# Patient Record
Sex: MALE | Race: WHITE | NOT HISPANIC OR LATINO | Employment: OTHER | URBAN - METROPOLITAN AREA
[De-identification: names, ages, dates, MRNs, and addresses within clinical notes are randomized per-mention and may not be internally consistent; named-entity substitution may affect disease eponyms.]

---

## 2017-01-18 ENCOUNTER — TELEPHONE (OUTPATIENT)
Dept: PULMONOLOGY | Facility: CLINIC | Age: 67
End: 2017-01-18

## 2017-01-18 NOTE — TELEPHONE ENCOUNTER
----- Message from Ginger Costello sent at 2017  9:48 AM CST -----  Contact: pt  Pt has appts on  and . Pt states that his referral is , and pt needs to have another referral sent to [facility] for approval (pls angella urgent). Pls call pt back at [phone].

## 2017-01-18 NOTE — TELEPHONE ENCOUNTER
----- Message from Kwasi Banerjee sent at 1/17/2017  2:04 PM CST -----  Contact: pt's spouse  She's calling in regards to the pt's scheduled appt's on 2/8/17 & 2/9/17, please advise, [phone] (home)

## 2017-02-03 ENCOUNTER — TELEPHONE (OUTPATIENT)
Dept: PULMONOLOGY | Facility: CLINIC | Age: 67
End: 2017-02-03

## 2017-02-03 NOTE — TELEPHONE ENCOUNTER
----- Message from Ginger Costello sent at 2/3/2017  9:30 AM CST -----  Contact: Pt  Pt is requesting to speak to the nurse regarding his appts on 02/08 and 02/09. [facility] needs to have authorizations for the appts. Pls call Ms. Montilla at [facility] at [phone] ext [phone]. Pt can be reached at [phone].

## 2017-02-03 NOTE — TELEPHONE ENCOUNTER
Mrs. Anant Zaragoza with [facility] referrals spoke with wife and will call her back with the confirmation

## 2017-02-06 ENCOUNTER — TELEPHONE (OUTPATIENT)
Dept: PULMONOLOGY | Facility: CLINIC | Age: 67
End: 2017-02-06

## 2017-02-06 NOTE — TELEPHONE ENCOUNTER
----- Message from Hellen Maza sent at 2/6/2017  9:45 AM CST -----  Pt Wife(Hellen) at [phone]//states pt has a C T Scan on 2-8-17//is calling to make sure your office received the  Authorization//they live a distance and does not want to drive here if you have not gotten it//he also has appts on 2-9-17 to get the results//please call asap//keri/carmina

## 2017-02-16 ENCOUNTER — HOSPITAL ENCOUNTER (OUTPATIENT)
Dept: RADIOLOGY | Facility: HOSPITAL | Age: 67
Discharge: HOME | End: 2017-02-16
Attending: NURSE PRACTITIONER
Payer: COMMERCIAL

## 2017-02-16 DIAGNOSIS — J44.9 CHRONIC OBSTRUCTIVE PULMONARY DISEASE, UNSPECIFIED COPD TYPE: ICD-10-CM

## 2017-02-16 PROCEDURE — 71250 CT THORAX DX C-: CPT | Mod: 26,,, | Performed by: RADIOLOGY

## 2017-02-16 PROCEDURE — 71250 CT THORAX DX C-: CPT | Mod: TC,PO

## 2017-02-17 ENCOUNTER — OFFICE VISIT (OUTPATIENT)
Dept: PULMONOLOGY | Facility: CLINIC | Age: 67
End: 2017-02-17
Payer: COMMERCIAL

## 2017-02-17 VITALS
DIASTOLIC BLOOD PRESSURE: 64 MMHG | SYSTOLIC BLOOD PRESSURE: 118 MMHG | BODY MASS INDEX: 35.28 KG/M2 | HEIGHT: 76 IN | HEART RATE: 82 BPM | WEIGHT: 289.69 LBS | OXYGEN SATURATION: 96 %

## 2017-02-17 DIAGNOSIS — J44.9 CHRONIC OBSTRUCTIVE PULMONARY DISEASE, UNSPECIFIED COPD TYPE: Primary | ICD-10-CM

## 2017-02-17 DIAGNOSIS — J61 ASBESTOSIS: ICD-10-CM

## 2017-02-17 DIAGNOSIS — R91.1 LUNG NODULE: ICD-10-CM

## 2017-02-17 DIAGNOSIS — F17.290 CIGAR SMOKER: ICD-10-CM

## 2017-02-17 PROCEDURE — 99999 PR PBB SHADOW E&M-EST. PATIENT-LVL IV: CPT | Mod: PBBFAC,,, | Performed by: NURSE PRACTITIONER

## 2017-02-17 PROCEDURE — 99214 OFFICE O/P EST MOD 30 MIN: CPT | Mod: S$PBB,,, | Performed by: NURSE PRACTITIONER

## 2017-02-17 PROCEDURE — 99214 OFFICE O/P EST MOD 30 MIN: CPT | Mod: PBBFAC,PO | Performed by: NURSE PRACTITIONER

## 2017-02-17 NOTE — PROGRESS NOTES
"Subjective:      Patient ID: Issac Núñez II is a 66 y.o. male.    Chief Complaint: COPD    HPI Comments: Patient presents to the office today for evaluation of COPD and lung nodule.  Recent CT scan.  Screening CT scan with noted lung nodule November 2015 which has remained stable.  December 4, 2016, patient was admitted to [facility] ICU on ventilator for 5 days.  He has since started back smoking cigars "here and there".  He is compliant with Symbicort [facility]  No fever, chills, hemoptysis.  His breathing is back to baseline. He has a diagnosis of silicosis and asbestosis. 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    COPD     Asthma   He complains of shortness of breath. His past medical history is significant for asthma and COPD.   Answers for HPI/ROS submitted by the patient on 2/17/2017   In the past 4 weeks, how much of the time did your asthma keep you from getting as much done at work, school, or at home?: most of the time  During the past 4 weeks, how often have you had shortness of breath?: more than once a day  During the past 4 weeks, how often did your asthma symptoms (Wheezing, coughing, shortness of breath, chest tightness or pain) wake you up at night or earlier that usual in the morning?: 2 or 3 nights a week  During the past 4 weeks, how often have you used your rescue inhaler or nebulizer medication (such as albuterol)?: 2 or 3 times a week  How would you rate your asthma control during the past 4 weeks?: somewhat controlled   : 11      Visit Vitals    /64    Pulse 82    Ht 6' 3.5" (1.918 m)    Wt 131.4 kg (289 lb 11 oz)    SpO2 96%    BMI 35.73 kg/m2     Body mass index is 35.73 kg/(m^2).    Review of Systems   Constitutional: Negative.    HENT: Negative.    Respiratory: Positive for shortness of breath.    Cardiovascular: Negative.    Musculoskeletal: Negative.    Gastrointestinal: Negative.    Neurological: Negative.  "   Psychiatric/Behavioral: Negative.      Objective:      Physical Exam   Constitutional: He is oriented to person, place, and time. He appears well-developed and well-nourished.   HENT:   Head: Normocephalic and atraumatic.   Nose: Nose normal.   Mouth/Throat: Uvula is midline and oropharynx is clear and moist.   Neck: Trachea normal and normal range of motion. Neck supple. No thyroid mass and no thyromegaly present.   Cardiovascular: Normal rate and regular rhythm.    Pulmonary/Chest: Effort normal. He has no wheezes. He has no rhonchi. He has no rales. Chest wall is not dull to percussion.   Decreased breath sounds   Abdominal: Soft. He exhibits no mass. There is no hepatosplenomegaly or splenomegaly. There is no tenderness.   Musculoskeletal: Normal range of motion. He exhibits no edema.   Neurological: He is alert and oriented to person, place, and time.   Skin: Skin is warm and dry.   Psychiatric: He has a normal mood and affect.     Personal Diagnostic Review    Comparison August 3, 2016. CT of the chest performed without contrast history is followup pulmonary nodule low dose protocol performed.  Findings: Stable diffuse centrilobular type emphysematous change no granulomatous disease. Stable 7.2 mm noncalcified nodule in the anterior right upper lobe. 4.5 mm series 2 image 35. Stable chronic pleural-parenchymal scarring. Stable arteriosclerotic disease. Stable calcified adenopathy. No new significant findings in the upper abdomen. Stable multilevel degenerative change in the spine with osteopenia.       Impression       Stable bilateral pulmonary nodules. Per Fleischer Society guidelines for nodule >6-8mm; in a low risk patient, consider 6-12 month CT chest follow-up and then at 18-24 month if no change. In a high risk patient/smoker, consider 3-6 month CT chest follow-up and then at 9-12 months and 24 months if unchanged to exclude neoplasia. If stable at that time no further follow-up  needed.      Electronically signed by: DANIKA TAYLOR MD  Date: 02/16/17  Time: 13:46          Assessment:       1. Chronic obstructive pulmonary disease, unspecified COPD type    2. Lung nodule    3. Cigar smoker    4. Asbestosis        Outpatient Encounter Prescriptions as of 2/17/2017   Medication Sig Dispense Refill    albuterol 90 mcg/actuation inhaler Inhale 2 puffs into the lungs.      amitriptyline (ELAVIL) 50 MG tablet Take 50 mg by mouth.      budesonide-formoterol 160-4.5 mcg (SYMBICORT) 160-4.5 mcg/actuation HFAA Inhale 2 puffs into the lungs every 12 (twelve) hours.      furosemide (LASIX) 40 MG tablet Take 40 mg by mouth.      gabapentin (NEURONTIN) 300 MG capsule Take 300 mg by mouth.      hydrocodone-acetaminophen 10-325mg (NORCO)  mg Tab Take 7.5 tablets by mouth.      hydrocortisone 2.5 % cream Apply topically.      insulin glargine (LANTUS) 100 unit/mL injection Inject 65 Units into the skin every evening.      lancets Misc by Misc.(Non-Drug; Combo Route) route.      metformin (GLUCOPHAGE) 1000 MG tablet Take 1,000 mg by mouth.      methadone (DOLOPHINE) 10 MG tablet Take 10 mg by mouth.      tiotropium (SPIRIVA) 18 mcg inhalation capsule Inhale 18 mcg into the lungs once daily.      tiotropium bromide (SPIRIVA RESPIMAT) 2.5 mcg/actuation Mist Inhale 5 mcg into the lungs once daily. 4 g 11    [DISCONTINUED] tiotropium bromide (SPIRIVA RESPIMAT) 2.5 mcg/actuation Mist Inhale 5 mcg into the lungs once daily. 4 g 11    [DISCONTINUED] loratadine (CLARITIN) 10 mg tablet Take 10 mg by mouth.       No facility-administered encounter medications on file as of 2/17/2017.      No orders of the defined types were placed in this encounter.    Plan:      Encouraged smoking cessation.  Patient using nicotine supplements.  He has been referred to the smoke a cessation program and pulmonary rehabilitation in the past.  He has not attended pulmonary rehabilitation, and would like to wait but  is an option.  Follow-up in 3 months with spirometry.  CT scan November 2017 which will be 2 years of monitoring

## 2017-05-18 ENCOUNTER — PROCEDURE VISIT (OUTPATIENT)
Dept: PULMONOLOGY | Facility: CLINIC | Age: 67
End: 2017-05-18
Payer: COMMERCIAL

## 2017-05-18 DIAGNOSIS — J44.9 CHRONIC OBSTRUCTIVE PULMONARY DISEASE, UNSPECIFIED COPD TYPE: ICD-10-CM

## 2019-04-01 ENCOUNTER — TELEPHONE (OUTPATIENT)
Dept: PULMONOLOGY | Facility: CLINIC | Age: 69
End: 2019-04-01

## 2019-04-01 DIAGNOSIS — J61 ASBESTOSIS: Primary | ICD-10-CM

## 2019-04-01 NOTE — TELEPHONE ENCOUNTER
Pt accepted [facility] appointment on 4/18/2019 at 0840 with Dr Beltran.  Pt provided times for all testing appointments.  Pt verbalized understanding.

## 2019-04-01 NOTE — TELEPHONE ENCOUNTER
----- Message from Anant Zaragoza sent at 4/1/2019  9:25 AM CDT -----  Contact[facility]  [facility]  to be scheduled with Dr Beltran, [facility] is [facility] by [facility], clinical packed and auth is scanned into . Thank you

## 2019-04-01 NOTE — TELEPHONE ENCOUNTER
----- Message from Michaela Willoughby sent at 4/1/2019 10:22 AM CDT -----  Contact: Hellen Núñez  Type:  Patient Returning Call    Who Called:Hellen Núñez  Who Left Message for Patient:Walker  Does the patient know what this is regarding?:chest xray & PFT  Would the patient rather a call back or a response via [employer]? Call back  Best Call Back Number:[phone]  Additional Information: .    Thank you

## 2019-04-16 ENCOUNTER — TELEPHONE (OUTPATIENT)
Dept: PULMONOLOGY | Facility: CLINIC | Age: 69
End: 2019-04-16

## 2019-04-16 NOTE — TELEPHONE ENCOUNTER
Pt contacted to reschedule appointment due to Devin SALDANA being unavailable.  Pt accepted [facility] appointment on 5/14/2019 at 0900 with Dr Beltran.  Pt provided times for all testing appointments.  Pt verbalized understanding.

## 2019-05-14 ENCOUNTER — OFFICE VISIT (OUTPATIENT)
Dept: PULMONOLOGY | Facility: CLINIC | Age: 69
End: 2019-05-14
Payer: OTHER GOVERNMENT

## 2019-05-14 ENCOUNTER — HOSPITAL ENCOUNTER (OUTPATIENT)
Dept: RADIOLOGY | Facility: HOSPITAL | Age: 69
Discharge: HOME | End: 2019-05-14
Attending: INTERNAL MEDICINE
Payer: OTHER GOVERNMENT

## 2019-05-14 ENCOUNTER — CLINICAL SUPPORT (OUTPATIENT)
Dept: PULMONOLOGY | Facility: CLINIC | Age: 69
End: 2019-05-14
Payer: OTHER GOVERNMENT

## 2019-05-14 VITALS
WEIGHT: 310.19 LBS | RESPIRATION RATE: 20 BRPM | BODY MASS INDEX: 39.81 KG/M2 | DIASTOLIC BLOOD PRESSURE: 68 MMHG | SYSTOLIC BLOOD PRESSURE: 124 MMHG | OXYGEN SATURATION: 96 % | HEIGHT: 74 IN | HEART RATE: 70 BPM

## 2019-05-14 DIAGNOSIS — R91.1 LUNG NODULE: ICD-10-CM

## 2019-05-14 DIAGNOSIS — J69.0 ASPIRATION PNEUMONIA OF RIGHT LOWER LOBE, UNSPECIFIED ASPIRATION PNEUMONIA TYPE: ICD-10-CM

## 2019-05-14 DIAGNOSIS — J41.0 SIMPLE CHRONIC BRONCHITIS: ICD-10-CM

## 2019-05-14 DIAGNOSIS — J62.8 PNEUMOCONIOSIS DUE TO SILICA: ICD-10-CM

## 2019-05-14 DIAGNOSIS — J61 ASBESTOSIS: ICD-10-CM

## 2019-05-14 DIAGNOSIS — R13.10 DYSPHAGIA, UNSPECIFIED TYPE: Primary | ICD-10-CM

## 2019-05-14 PROBLEM — F10.90 ALCOHOL USE DISORDER: Status: ACTIVE | Noted: 2018-06-19

## 2019-05-14 PROBLEM — J96.11 CHRONIC RESPIRATORY FAILURE WITH HYPOXIA AND HYPERCAPNIA: Status: ACTIVE | Noted: 2017-12-04

## 2019-05-14 PROBLEM — R06.02 SHORTNESS OF BREATH: Status: ACTIVE | Noted: 2017-10-03

## 2019-05-14 PROBLEM — J96.12 CHRONIC RESPIRATORY FAILURE WITH HYPOXIA AND HYPERCAPNIA: Status: ACTIVE | Noted: 2017-12-04

## 2019-05-14 PROBLEM — I50.32 CHRONIC DIASTOLIC HEART FAILURE: Status: ACTIVE | Noted: 2018-06-19

## 2019-05-14 LAB
BRPFT: ABNORMAL
DLCO ADJ PRE: 15.54 ML/(MIN*MMHG) (ref 24.76–38.61)
DLCO SINGLE BREATH LLN: 24.76
DLCO SINGLE BREATH PRE REF: 49 %
DLCO SINGLE BREATH REF: 31.69
DLCOC SBVA LLN: 2.91
DLCOC SBVA PRE REF: 101.3 %
DLCOC SBVA REF: 3.95
DLCOC SINGLE BREATH LLN: 24.76
DLCOC SINGLE BREATH PRE REF: 49 %
DLCOC SINGLE BREATH REF: 31.69
DLCOVA LLN: 2.91
DLCOVA PRE REF: 101.3 %
DLCOVA PRE: 4 ML/(MIN*MMHG*L) (ref 2.91–4.99)
DLCOVA REF: 3.95
DLVAADJ PRE: 4 ML/(MIN*MMHG*L) (ref 2.91–4.99)
ERV LLN: 1.23
ERV PRE REF: 84.9 %
ERV REF: 1.23
FEF 25 75 CHG: 0 %
FEF 25 75 LLN: 1.31
FEF 25 75 POST REF: 14.1 %
FEF 25 75 PRE REF: 14.1 %
FEF 25 75 REF: 2.89
FET100 CHG: 15 %
FEV1 CHG: 5.7 %
FEV1 FVC CHG: 1.9 %
FEV1 FVC LLN: 63
FEV1 FVC POST REF: 60.4 %
FEV1 FVC PRE REF: 59.3 %
FEV1 FVC REF: 76
FEV1 LLN: 2.8
FEV1 POST REF: 42.1 %
FEV1 PRE REF: 39.8 %
FEV1 REF: 3.82
FEV6 CHG: 2.1 %
FEV6 LLN: 4.06
FEV6 POST REF: 55.5 %
FEV6 POST: 2.82 L (ref 4.06–6.1)
FEV6 PRE REF: 54.4 %
FEV6 PRE: 2.76 L (ref 4.06–6.1)
FEV6 REF: 5.08
FRCPLETH LLN: 2.94
FRCPLETH PREREF: 106.2 %
FRCPLETH REF: 3.93
FVC CHG: 3.7 %
FVC LLN: 3.8
FVC POST REF: 69.4 %
FVC PRE REF: 66.9 %
FVC REF: 5.07
IVC PRE: 2.92 L (ref 3.8–6.35)
IVC SINGLE BREATH LLN: 3.8
IVC SINGLE BREATH PRE REF: 57.7 %
IVC SINGLE BREATH REF: 5.07
MVV LLN: 127
MVV PRE REF: 43 %
MVV REF: 149
PEF CHG: 9.4 %
PEF LLN: 7.14
PEF POST REF: 55 %
PEF PRE REF: 50.3 %
PEF REF: 9.76
POST FEF 25 75: 0.41 L/S (ref 1.31–4.48)
POST FET 100: 16.35 SEC
POST FEV1 FVC: 45.72 % (ref 62.92–88.52)
POST FEV1: 1.61 L (ref 2.8–4.84)
POST FVC: 3.52 L (ref 3.8–6.35)
POST PEF: 5.37 L/S (ref 7.14–12.38)
PRE DLCO: 15.54 ML/(MIN*MMHG) (ref 24.76–38.61)
PRE ERV: 1.04 L (ref 1.23–1.23)
PRE FEF 25 75: 0.41 L/S (ref 1.31–4.48)
PRE FET 100: 14.22 SEC
PRE FEV1 FVC: 44.87 % (ref 62.92–88.52)
PRE FEV1: 1.52 L (ref 2.8–4.84)
PRE FRC PL: 4.17 L
PRE FVC: 3.39 L (ref 3.8–6.35)
PRE MVV: 64 L/MIN (ref 126.58–171.26)
PRE PEF: 4.91 L/S (ref 7.14–12.38)
PRE RV: 2.9 L (ref 2.02–3.37)
PRE TLC: 6.29 L (ref 6.87–9.17)
RAW LLN: 3.06
RAW PRE REF: 158.5 %
RAW PRE: 4.85 CMH2O*S/L (ref 3.06–3.06)
RAW REF: 3.06
RV LLN: 2.02
RV PRE REF: 107.4 %
RV REF: 2.7
RVTLC LLN: 31
RVTLC PRE REF: 116 %
RVTLC PRE: 46.06 % (ref 30.72–48.68)
RVTLC REF: 40
TLC LLN: 6.87
TLC PRE REF: 78.4 %
TLC REF: 8.02
VA PRE: 3.88 L (ref 7.87–7.87)
VA SINGLE BREATH LLN: 7.87
VA SINGLE BREATH PRE REF: 49.4 %
VA SINGLE BREATH REF: 7.87
VC LLN: 3.8
VC PRE REF: 66.9 %
VC PRE: 3.39 L (ref 3.8–6.35)
VC REF: 5.07
VTGRAWPRE: 4.67 L

## 2019-05-14 PROCEDURE — 94729 PR C02/MEMBANE DIFFUSE CAPACITY: ICD-10-PCS | Mod: 26,S$PBB,, | Performed by: INTERNAL MEDICINE

## 2019-05-14 PROCEDURE — 94060 EVALUATION OF WHEEZING: CPT | Mod: PBBFAC

## 2019-05-14 PROCEDURE — 99215 OFFICE O/P EST HI 40 MIN: CPT | Mod: PBBFAC,25 | Performed by: INTERNAL MEDICINE

## 2019-05-14 PROCEDURE — 94726 PLETHYSMOGRAPHY LUNG VOLUMES: CPT | Mod: PBBFAC

## 2019-05-14 PROCEDURE — 94060 PR EVAL OF BRONCHOSPASM: ICD-10-PCS | Mod: 26,S$PBB,, | Performed by: INTERNAL MEDICINE

## 2019-05-14 PROCEDURE — 94726 PULM FUNCT TST PLETHYSMOGRAP: ICD-10-PCS | Mod: 26,S$PBB,, | Performed by: INTERNAL MEDICINE

## 2019-05-14 PROCEDURE — 71048 X-RAY EXAM CHEST 4+ VIEWS: CPT | Mod: TC

## 2019-05-14 PROCEDURE — 94729 DIFFUSING CAPACITY: CPT | Mod: 26,S$PBB,, | Performed by: INTERNAL MEDICINE

## 2019-05-14 PROCEDURE — 71048 X-RAY EXAM CHEST 4+ VIEWS: CPT | Mod: 26,,, | Performed by: RADIOLOGY

## 2019-05-14 PROCEDURE — 99999 PR PBB SHADOW E&M-EST. PATIENT-LVL V: CPT | Mod: PBBFAC,,, | Performed by: INTERNAL MEDICINE

## 2019-05-14 PROCEDURE — 99215 PR OFFICE/OUTPT VISIT, EST, LEVL V, 40-54 MIN: ICD-10-PCS | Mod: 25,S$PBB,, | Performed by: INTERNAL MEDICINE

## 2019-05-14 PROCEDURE — 94060 EVALUATION OF WHEEZING: CPT | Mod: 26,S$PBB,, | Performed by: INTERNAL MEDICINE

## 2019-05-14 PROCEDURE — 94726 PLETHYSMOGRAPHY LUNG VOLUMES: CPT | Mod: 26,S$PBB,, | Performed by: INTERNAL MEDICINE

## 2019-05-14 PROCEDURE — 94729 DIFFUSING CAPACITY: CPT | Mod: PBBFAC

## 2019-05-14 PROCEDURE — 71048 XR CHEST 4 OR MORE VIEW: ICD-10-PCS | Mod: 26,,, | Performed by: RADIOLOGY

## 2019-05-14 PROCEDURE — 99215 OFFICE O/P EST HI 40 MIN: CPT | Mod: 25,S$PBB,, | Performed by: INTERNAL MEDICINE

## 2019-05-14 PROCEDURE — 99999 PR PBB SHADOW E&M-EST. PATIENT-LVL V: ICD-10-PCS | Mod: PBBFAC,,, | Performed by: INTERNAL MEDICINE

## 2019-05-14 RX ORDER — ALBUTEROL SULFATE 0.83 MG/ML
2.5 SOLUTION RESPIRATORY (INHALATION)
Qty: 270 ML | Refills: 11 | Status: SHIPPED | OUTPATIENT
Start: 2019-05-14 | End: 2020-05-13

## 2019-05-14 RX ORDER — DOXYCYCLINE 100 MG/1
100 CAPSULE ORAL EVERY 12 HOURS
Qty: 20 CAPSULE | Refills: 0 | Status: SHIPPED | OUTPATIENT
Start: 2019-05-14

## 2019-05-14 RX ORDER — ASPIRIN 81 MG/1
81 TABLET ORAL
COMMUNITY

## 2019-05-14 NOTE — PATIENT INSTRUCTIONS
Pulmonary Fibrosis  What is pulmonary fibrosis?  Pulmonary fibrosis is an interstitial lung disease. Interstitial lung diseases are a group of conditions that cause inflammation and scarring around the tiny air sacs (alveoli) in the lungs. The scarring is called fibrosis. It causes the tissues in the lungs to get thick and stiff. This makes it hard to take in oxygen. Often the cause is unknown. This is called idiopathic pulmonary fibrosis.      Normal lung         Lung with pulmonary fibrosis      What causes pulmonary fibrosis?  Most of the time, healthcare providers dont know the cause of pulmonary fibrosis. Things that can increase your risk of getting pulmonary fibrosis are:  · Smoking  · Certain viral infections   · Pollution, such as silica and metal dusts, bacteria, and gases  · Certain medicines  · Genetics. More than one family member may have pulmonary fibrosis.   · Gastroesophageal reflux disease (GERD)  What are the symptoms of pulmonary fibrosis?  The symptoms of pulmonary fibrosis include:  · Difficulty breathing or shortness of breath  · Cough  · Chest pain  · Feeling tired  · Joint pain  How is pulmonary fibrosis diagnosed?  Your healthcare provider will ask about your health history and current symptoms. He or she will do a physical exam. You may need diagnostic tests, such as a chest X-ray, a CT scan of the lungs, and blood tests. Other tests may include:  · Lung function tests. These tests find out how well your lungs work. A common test is spirometry.  · Bronchoalveolar lavage. A bronchoalveolar lavage looks at cells from your lungs. It is done during a bronchoscopy. A brochoscope is a special tool that lets your healthcare provide see inside your lungs. He or she can also use it to take small samples of tissue for testing.  · Lung biopsy. A small sample of lung tissue is taken and then looked at under a microscope. A biopsy is done during bronchoscopy or a surgical procedure.  · Exercise  testing. These tests show how well your lungs work during exercise.  How is pulmonary fibrosis treated?  Pulmonary fibrosis cant be cured. Treatment can help control the disease and improve symptoms. Your healthcare provider will discuss possible treatments with you. These can include:  · Medicines. These can help reduce inflammation in the lungs. They also can suppress your bodys immune system and help lessen scarring.  · Supplemental oxygen. This can help more oxygen get into your blood. Some people will need to use oxygen all the time. Others will only need it when they sleep or exercise.  · Breathing techniques. These can help you cope with shortness of breath.  · Pulmonary rehabilitation. This is a program of exercise and education that can help you gain strength and independence.   Date Last Reviewed: 12/1/2016  © 0606-6381 [employer]. [address] [place] [address], [place] [address]. All rights reserved. This information is not intended as a substitute for professional medical care. Always follow your healthcare professional's instructions.

## 2019-05-14 NOTE — PROGRESS NOTES
Subjective:       Patient ID: Issac Núñez II is a 68 y.o. male.    Chief Complaint: He       Asthma and COPD    Asthma   He complains of cough, shortness of breath and sputum production. Associated symptoms include postnasal drip and rhinorrhea. Pertinent negatives include no chest pain or fever. His past medical history is significant for asthma.          Dyspnea  Patient complains of shortness of breath. Symptoms occur while supine only, while getting dressed. Symptoms began 6 years ago, gradually worsening since. Associated symptoms include  cough after eating, dyspnea on exertion, shortness of breath and sputum production. He denies chest pain, located left chest. He does not have had recent travel. Weight has been stable. Symptoms are exacerbated by any exercise. Symptoms are alleviated by rest and oxygen.     Dysphagia  Patient complains of difficulty swallowing. The dysphagia occurs with solids Symptoms have been present for approximately 1 year. The symptoms are unchanged. The dysphagia has been intermittent and has not been progressive.  He problems with swallowing rice.  He denies melena, hematochezia, hematemesis, and coffee ground emesis.  This has been associated with choking on food, hoarseness and shortness of breath.  He denies nausea.      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  Toxic Fume Exposure: no to extent of seeking first aide    Former smoker  [facility]    Answers for HPI/ROS submitted by the patient on 2019   Asthma  In the past 4 weeks, how much of the time did your asthma keep you from getting as much done at work, school, or at home?: all of the time  During the past 4 weeks, how often have you had shortness of breath?:  more than once a day  During the past 4 weeks, how often did your asthma symptoms (Wheezing, coughing, shortness of breath, chest tightness or pain) wake you up at night or earlier that usual in the morning?: once or twice  During the past 4 weeks, how often have you used your rescue inhaler or nebulizer medication (such as albuterol)?: 3 or more times per day  How would you rate your asthma control during the past 4 weeks?: somewhat controlled   : 10        Past Medical History:   Diagnosis Date    Asbestosis     Asthma     Colon polyp     Constipation     Diabetes mellitus     H/O asbestosis     Lung disease     KIRK treated with BiPAP     Silicosis      Past Surgical History:   Procedure Laterality Date    APPENDECTOMY      EXCISIONAL HEMORRHOIDECTOMY      SPINAL CORD DECOMPRESSION       Social History     Socioeconomic History    Marital status:      Spouse name: Not on file    Number of children: Not on file    Years of education: Not on file    Highest education level: Not on file   Occupational History    Not on file   Social Needs    Financial resource strain: Not on file    Food insecurity:     Worry: Not on file     Inability: Not on file    Transportation needs:     Medical: Not on file     Non-medical: Not on file   Tobacco Use    Smoking status: Current Some Day Smoker     Packs/day: 2.00     Years: 37.00     Pack years: 74.00     Types: Cigars     Start date: 1975     Last attempt to quit: 2017     Years since quittin.9    Smokeless tobacco: Never Used   Substance and Sexual Activity    Alcohol use: No     Alcohol/week: 0.0 oz    Drug use: Not on file    Sexual activity: Not on file   Lifestyle    Physical activity:     Days per week: Not on file     Minutes per session: Not on file    Stress: Not on file   Relationships    Social connections:     Talks on phone: Not on file     Gets together: Not on file     Attends [employer]ism service: Not on file      "Active member of club or organization: Not on file     Attends meetings of clubs or organizations: Not on file     Relationship status: Not on file   Other Topics Concern    Not on file   Social History Narrative    Not on file     Review of Systems   Constitutional: Positive for fatigue. Negative for fever.   HENT: Positive for postnasal drip, rhinorrhea and congestion.    Eyes: Negative for redness and itching.   Respiratory: Positive for cough, sputum production, shortness of breath, dyspnea on extertion, use of rescue inhaler and Paroxysmal Nocturnal Dyspnea.    Cardiovascular: Negative for chest pain, palpitations and leg swelling.   Genitourinary: Negative for difficulty urinating and hematuria.   Endocrine: Negative for cold intolerance and heat intolerance.    Skin: Negative for rash.   Gastrointestinal: Negative for nausea and abdominal pain.   Neurological: Negative for dizziness, syncope, weakness and light-headedness.   Hematological: Negative for adenopathy. Does not bruise/bleed easily.   Psychiatric/Behavioral: Negative for sleep disturbance. The patient is not nervous/anxious.        Objective:      /68   Pulse 70   Resp 20   Ht 6' 2" (1.88 m)   Wt (!) 140.7 kg (310 lb 3.2 oz)   SpO2 96% Comment: 3 liters  BMI 39.83 kg/m²   Physical Exam   Constitutional: He is oriented to person, place, and time. He appears well-developed and well-nourished.   HENT:   Head: Normocephalic and atraumatic.   Mouth/Throat: Oropharyngeal exudate present.   Eyes: Pupils are equal, round, and reactive to light. Conjunctivae are normal.   Neck: Neck supple. No JVD present. No tracheal deviation present. No thyromegaly present.   Cardiovascular: Normal rate. An irregular rhythm present. PMI is displaced. Exam reveals gallop and S4.   Murmur heard.   Systolic murmur is present with a grade of 2/6.  Pulmonary/Chest: Effort normal. No respiratory distress. He has decreased breath sounds. He has no wheezes. He has " no rhonchi. He has rales in the right lower field and the left lower field. He exhibits no tenderness.   Abdominal: Soft. Bowel sounds are normal.   Musculoskeletal: Normal range of motion. He exhibits no edema or tenderness.   Lymphadenopathy:     He has no cervical adenopathy.   Neurological: He is alert and oriented to person, place, and time.   Skin: Skin is warm and dry.   Nursing note and vitals reviewed.    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    COMPARISON:  06/23/2010 CT dated 02/26/2017    FINDINGS:  Cardiac silhouette appears borderline enlarged.  There are new patchy airspace opacities in the posterior right lower lobe, concerning for possible pneumonia.  Clinical correlation is recommended.  There is mild diffuse prominence of the bronchovascular markings and interstitium.  No definite pleural effusion visualized.  No definite calcified pleural plaque demonstrated.  Multilevel degenerative findings noted throughout the visualized spine.  Impression: As above.  Follow-up to resolution is recommended.    Electronically signed by: Todd Pierre MD  Date:    05/14/2019  Time:    08:31        Outside CT scan:  CT Chest without Contrast1/31/2018  [facility]  Result Impression     1.  Old, healed granulomatous disease in the chest, with no suspicious noncalcified pulmonary parenchymal nodules noted.    2.  Pulmonary emphysema with asymmetric elevation of the right hemidiaphragm, likely related to eventration.    3.  Borderline cardiomegaly.    4.  Mild aneurysmal dilatation of the ascending aorta, measuring up to 4 cm in diameter.    5.  Hepatomegaly.    6.  Probable right midpole renal cortical  cyst measuring 2.1 x 2.2 cm.    7.  T12-L1 spinal and bilateral lateral recess stenosis.   Result Narrative   CT CHEST WO CONTRAST    CLINICAL HISTORY:  Pulmonary nodules; R 91.8    COMPARISON:  12/27/2017 single view chest    TECHNIQUE:  Spiral CT imaging of the chest was obtained without the aid of intravenous contrast. Evaluation of the thoracic vessels is limited without the use of intravenous contrast. Images are displayed in an axial plane. Automated exposure control was used for dose reduction.    FINDINGS:    There is mild biapical pleural and/or pulmonary parenchymal scarring. 3.5 mm diameter calcified granuloma is seen in the right pulmonary apex. There is a noncalcified, 4 mm nodular opacity at the right pulmonary apex on series 3, image 13, probably part of an area of parenchymal scarring. A few additional, scattered, tiny calcified granulomata are seen in both lower lobes and inferiorly in the lingular segment of the left upper lobe. No lobar pulmonary consolidation or edema is seen.    There are calcified, bilateral hilar and mediastinal lymph nodes, with no noncalcified adenopathy.    The trachea and major bronchi are patent. There is no pneumothorax.    There is borderline cardiomegaly with no evidence of any pericardial or pleural effusion. There is mild calcific atherosclerotic plaque in the thoracic aorta, and the ascending aorta measures up to 4 cm in diameter, mildly dilated.    Included portions of the thyroid gland are normal.    In the included portions of the upper abdomen, there is mild hepatomegaly. The spleen is normal in size, and the liver and spleen appear normal in density. The gallbladder, biliary ductal system, pancreas, and adrenal glands are normal. A vague hypodense lesion in the right midpole renal cortex is probably related to cortical cyst, measuring 2.1 x 2.2 cm, with Hounsfield units of approximately -2.5.    There is a mild, sinuous, rotatory thoracic scoliosis with  multilevel spondylosis and with prominent ligamentum flavum calcification at T12-L1, with a spinal stenosis and with bilateral lateral recess stenoses at that level.   Other Result Information   Interface, Rad Results In - 01/31/2018  8:40 AM CST  CT CHEST WO CONTRAST    CLINICAL HISTORY:  Pulmonary nodules; R 91.8    COMPARISON:  12/27/2017 single view chest    TECHNIQUE:  Spiral CT imaging of the chest was obtained without the aid of intravenous contrast. Evaluation of the thoracic vessels is limited without the use of intravenous contrast. Images are displayed in an axial plane. Automated exposure control was used for dose reduction.    FINDINGS:    There is mild biapical pleural and/or pulmonary parenchymal scarring. 3.5 mm diameter calcified granuloma is seen in the right pulmonary apex. There is a noncalcified, 4 mm nodular opacity at the right pulmonary apex on series 3, image 13, probably part of an area of parenchymal scarring. A few additional, scattered, tiny calcified granulomata are seen in both lower lobes and inferiorly in the lingular segment of the left upper lobe. No lobar pulmonary consolidation or edema is seen.    There are calcified, bilateral hilar and mediastinal lymph nodes, with no noncalcified adenopathy.    The trachea and major bronchi are patent. There is no pneumothorax.    There is borderline cardiomegaly with no evidence of any pericardial or pleural effusion. There is mild calcific atherosclerotic plaque in the thoracic aorta, and the ascending aorta measures up to 4 cm in diameter, mildly dilated.    Included portions of the thyroid gland are normal.    In the included portions of the upper abdomen, there is mild hepatomegaly. The spleen is normal in size, and the liver and spleen appear normal in density. The gallbladder, biliary ductal system, pancreas, and adrenal glands are normal. A vague hypodense lesion in the right midpole renal cortex is probably related to cortical cyst,  measuring 2.1 x 2.2 cm, with Hounsfield units of approximately -2.5.    There is a mild, sinuous, rotatory thoracic scoliosis with multilevel spondylosis and with prominent ligamentum flavum calcification at T12-L1, with a spinal stenosis and with bilateral lateral recess stenoses at that level.    IMPRESSION:    1.  Old, healed granulomatous disease in the chest, with no suspicious noncalcified pulmonary parenchymal nodules noted.    2.  Pulmonary emphysema with asymmetric elevation of the right hemidiaphragm, likely related to eventration.    3.  Borderline cardiomegaly.    4.  Mild aneurysmal dilatation of the ascending aorta, measuring up to 4 cm in diameter.    5.  Hepatomegaly.    6.  Probable right midpole renal cortical cyst measuring 2.1 x 2.2 cm.    7.  T12-L1 spinal and bilateral lateral recess stenosis.         Office Spirometry Results:     No flowsheet data found.  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  Dispense: 20 capsule; Refill: 0    Lung nodule  -     CT Chest Without Contrast; Future; Expected date: 05/14/2019    Simple chronic bronchitis  -     albuterol (PROVENTIL) 2.5 mg /3 mL (0.083 %) nebulizer solution; Take 3 mLs (2.5 mg total) by nebulization every 6 (six) hours while awake.  Dispense: 270 mL; Refill: 11  -     NEBULIZER KIT (SUPPLIES) FOR HOME USE          Outpatient Encounter  Medications as of 5/14/2019   Medication Sig Dispense Refill    albuterol 90 mcg/actuation inhaler Inhale 2 puffs into the lungs.      amitriptyline (ELAVIL) 50 MG tablet Take 50 mg by mouth.      budesonide-formoterol 160-4.5 mcg (SYMBICORT) 160-4.5 mcg/actuation HFAA Inhale 2 puffs into the lungs every 12 (twelve) hours.      furosemide (LASIX) 40 MG tablet Take 40 mg by mouth.      gabapentin (NEURONTIN) 300 MG capsule Take 300 mg by mouth.      hydrocodone-acetaminophen 10-325mg (NORCO)  mg Tab Take 7.5 tablets by mouth.      hydrocortisone 2.5 % cream Apply topically.      insulin glargine (LANTUS) 100 unit/mL injection Inject 65 Units into the skin every evening.      lancets Misc by Misc.(Non-Drug; Combo Route) route.      metformin (GLUCOPHAGE) 1000 MG tablet Take 1,000 mg by mouth.      methadone (DOLOPHINE) 10 MG tablet Take 10 mg by mouth.      tiotropium (SPIRIVA) 18 mcg inhalation capsule Inhale 18 mcg into the lungs once daily.      tiotropium bromide (SPIRIVA RESPIMAT) 2.5 mcg/actuation Mist Inhale 5 mcg into the lungs once daily. 4 g 11    albuterol (PROVENTIL) 2.5 mg /3 mL (0.083 %) nebulizer solution Take 3 mLs (2.5 mg total) by nebulization every 6 (six) hours while awake. 270 mL 11    aspirin (ECOTRIN) 81 MG EC tablet Take 81 mg by mouth.      doxycycline (MONODOX) 100 MG capsule Take 1 capsule (100 mg total) by mouth every 12 (twelve) hours. 20 capsule 0     No facility-administered encounter medications on file as of 5/14/2019.      Plan:       Requested Prescriptions     Signed Prescriptions Disp Refills    albuterol (PROVENTIL) 2.5 mg /3 mL (0.083 %) nebulizer solution 270 mL 11     Sig: Take 3 mLs (2.5 mg total) by nebulization every 6 (six) hours while awake.    doxycycline (MONODOX) 100 MG capsule 20 capsule 0     Sig: Take 1 capsule (100 mg total) by mouth every 12 (twelve) hours.     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    MEDICAL DECISION MAKING: Moderate to high complexity.  Overall, the multiple problems listed are of moderate to high severity that may impact quality of life and activities of daily living. Side effects of medications, treatment plan as well as options and alternatives reviewed and discussed with patient. There was counseling of patient concerning these issues.    Total time spent in face to face counseling and coordination of care - 45  minutes over 50% of time was used in discussion of prognosis, risks, benefits of treatment, instructions and compliance with regimen . Discussion with other physicians or health care providers (DME, NP, pharmacy, respiratory therapy) occurred.

## 2019-06-24 ENCOUNTER — TELEPHONE (OUTPATIENT)
Dept: PULMONOLOGY | Facility: CLINIC | Age: 69
End: 2019-06-24

## 2019-06-24 NOTE — TELEPHONE ENCOUNTER
Called patients wife and informed her that the CT will be done tomorrow and [facility]. Offered her an appt at [facility] and she declined. She declined on seeing anyone other than Devin. She confirmed and wanted to keep her appt for Thurday ----- Message from Cassia Mcmillan sent at 6/24/2019 10:23 AM CDT -----  Contact: wife  She's calling in regards to speak with nurse     Wife wasn't specific     pls call back at   [phone] (home)

## 2019-06-25 ENCOUNTER — HOSPITAL ENCOUNTER (OUTPATIENT)
Dept: RADIOLOGY | Facility: HOSPITAL | Age: 69
Discharge: HOME | End: 2019-06-25
Attending: INTERNAL MEDICINE
Payer: COMMERCIAL

## 2019-06-25 DIAGNOSIS — R91.1 LUNG NODULE: ICD-10-CM

## 2019-06-25 DIAGNOSIS — J69.0 ASPIRATION PNEUMONIA OF RIGHT LOWER LOBE, UNSPECIFIED ASPIRATION PNEUMONIA TYPE: ICD-10-CM

## 2019-06-25 PROCEDURE — 71250 CT THORAX DX C-: CPT | Mod: TC

## 2019-06-25 PROCEDURE — 71250 CT THORAX DX C-: CPT | Mod: 26,,, | Performed by: RADIOLOGY

## 2019-06-25 PROCEDURE — 71250 CT CHEST WITHOUT CONTRAST: ICD-10-PCS | Mod: 26,,, | Performed by: RADIOLOGY

## 2019-06-26 ENCOUNTER — TELEPHONE (OUTPATIENT)
Dept: PULMONOLOGY | Facility: CLINIC | Age: 69
End: 2019-06-26

## 2019-06-26 NOTE — TELEPHONE ENCOUNTER
Patients wife called clinic to inform me that patients breathing is decreasing and that she is taking pt to [facility]. She states he will not make his appt. Informed patient to call me when she knows his plan of care and we will reschedule accordingly

## 2019-07-11 ENCOUNTER — TELEPHONE (OUTPATIENT)
Dept: PULMONOLOGY | Facility: CLINIC | Age: 69
End: 2019-07-11

## 2019-07-11 NOTE — TELEPHONE ENCOUNTER
The patient called on 06/26/2019 to discuss findings of abnormal CT scan.  However, on that day the patient was on his way to the emergency room at [facility].  He was treated at [facility] was felt that he may have had a lung cancer.  Patient's wife on informed me that that patient has subsequently passed away.    Called an expressed my condolences of his passing
